# Patient Record
Sex: MALE | Race: WHITE | Employment: OTHER | URBAN - NONMETROPOLITAN AREA
[De-identification: names, ages, dates, MRNs, and addresses within clinical notes are randomized per-mention and may not be internally consistent; named-entity substitution may affect disease eponyms.]

---

## 2018-08-23 ENCOUNTER — HOSPITAL ENCOUNTER (EMERGENCY)
Facility: HOSPITAL | Age: 63
Discharge: HOME | End: 2018-08-23
Attending: PHYSICIAN ASSISTANT | Admitting: PHYSICIAN ASSISTANT
Payer: COMMERCIAL

## 2018-08-23 VITALS
DIASTOLIC BLOOD PRESSURE: 81 MMHG | OXYGEN SATURATION: 98 % | SYSTOLIC BLOOD PRESSURE: 128 MMHG | HEART RATE: 89 BPM | RESPIRATION RATE: 16 BRPM | TEMPERATURE: 98.4 F

## 2018-08-23 DIAGNOSIS — J20.8 ACUTE BRONCHITIS DUE TO OTHER SPECIFIED ORGANISMS: ICD-10-CM

## 2018-08-23 PROCEDURE — G0463 HOSPITAL OUTPT CLINIC VISIT: HCPCS

## 2018-08-23 PROCEDURE — 99203 OFFICE O/P NEW LOW 30 MIN: CPT | Performed by: PHYSICIAN ASSISTANT

## 2018-08-23 RX ORDER — AZITHROMYCIN 250 MG/1
TABLET, FILM COATED ORAL
Qty: 6 TABLET | Refills: 0 | Status: SHIPPED | OUTPATIENT
Start: 2018-08-23

## 2018-08-23 ASSESSMENT — ENCOUNTER SYMPTOMS
VOMITING: 0
EYE REDNESS: 0
ABDOMINAL PAIN: 0
FATIGUE: 0
NECK PAIN: 0
NECK STIFFNESS: 0
APPETITE CHANGE: 0
HEADACHES: 0
NAUSEA: 0
CARDIOVASCULAR NEGATIVE: 1
DIZZINESS: 0
COUGH: 1
DIARRHEA: 0
SORE THROAT: 0
LIGHT-HEADEDNESS: 0
TROUBLE SWALLOWING: 0
PSYCHIATRIC NEGATIVE: 1
EYE DISCHARGE: 0
VOICE CHANGE: 0
FEVER: 0
SINUS PRESSURE: 0

## 2018-08-23 NOTE — ED TRIAGE NOTES
Pt presents today with SO with c/o exposure to whooping cough from son who has been sick for several weeks. Pt states he has had a cough for 3 weeks that is not going away.

## 2018-08-23 NOTE — ED AVS SNAPSHOT
[facility]    [address] [address]    Phone:  [phone]                                       Mulugeta White   MRN: [id number]    Department:  [facility]   Date of Visit:  8/23/2018           After Visit Summary Signature Page     I have received my discharge instructions, and my questions have been answered. I have discussed any challenges I see with this plan with the nurse or doctor.    ..........................................................................................................................................  Patient/Patient Representative Signature      ..........................................................................................................................................  Patient Representative Print Name and Relationship to Patient    ..................................................               ................................................  Date                                            Time    ..........................................................................................................................................  Reviewed by Signature/Title    ...................................................              ..............................................  Date                                                            Time          22EPIC Rev 08/18

## 2018-08-23 NOTE — ED PROVIDER NOTES
"  History     Chief Complaint   Patient presents with     Pertussis Exposure     The history is provided by the patient. No  was used.     Mulugeta White is a 62 year old male who has had cough/congestion x 3 weeks. His son was just dx with \"Pertussis.\" no n/v/d/f/c. No face/sinus pain/pressure. No rash. No decrease in energy/appetite. No ear pain. No sore throat        Past Medical History:    Past Medical History:   Diagnosis Date     ACL tear      Hyperlipidemia      Hypertension      Osteoarthrosis      Rotator cuff rupture        Past Surgical History:    Past Surgical History:   Procedure Laterality Date     C REPAIR CRUCIATE LIGAMENT,KNEE      right     COLONOSCOPY  12/13/2013    Procedure: COLONOSCOPY;  COLONOSCOPY WITH POSSIBLE BIOPSIES;  Surgeon: Nishant Weinstein MD;  Location: [facility]     ROTATOR CUFF REPAIR RT/LT       VASECTOMY         Family History:    Family History   Problem Relation Age of Onset     Diabetes Mother      Hypertension Mother        Social History:  Marital Status:   [2]  Social History   Substance Use Topics     Smoking status: Never Smoker     Smokeless tobacco: Never Used     Alcohol use No        Medications:      azithromycin (ZITHROMAX) 250 MG tablet   amLODIPine (NORVASC) 5 MG tablet   DiphenhydrAMINE HCl (BENADRYL PO)   rosuvastatin (CRESTOR) 10 MG tablet         Review of Systems   Constitutional: Negative for appetite change, fatigue and fever.   HENT: Positive for congestion. Negative for ear pain, sinus pressure, sore throat, trouble swallowing and voice change.    Eyes: Negative for discharge and redness.   Respiratory: Positive for cough.    Cardiovascular: Negative.    Gastrointestinal: Negative for abdominal pain, diarrhea, nausea and vomiting.   Genitourinary: Negative.    Musculoskeletal: Negative for neck pain and neck stiffness.   Skin: Negative for rash.   Neurological: Negative for dizziness, light-headedness and headaches. "   Psychiatric/Behavioral: Negative.        Physical Exam   BP: 128/81  Pulse: 89  Temp: 98.4  F (36.9  C)  Resp: 16  SpO2: 98 %      Physical Exam   Constitutional: He is oriented to person, place, and time. He appears well-developed and well-nourished. No distress.   HENT:   Head: Normocephalic and atraumatic.   Right Ear: External ear normal.   Left Ear: External ear normal.   Mouth/Throat: Oropharynx is clear and moist.   Bilateral TMs/canals clear/wnl  No sinus TTP     Eyes: Conjunctivae and EOM are normal. Right eye exhibits no discharge. Left eye exhibits no discharge.   Neck: Normal range of motion. Neck supple.   Cardiovascular: Normal rate, regular rhythm and normal heart sounds.    Pulmonary/Chest: Effort normal and breath sounds normal. No respiratory distress.   Abdominal: Soft. Bowel sounds are normal. He exhibits no distension. There is no tenderness.   Neurological: He is alert and oriented to person, place, and time.   Skin: Skin is warm and dry. No rash noted. He is not diaphoretic.   Psychiatric: He has a normal mood and affect.   Nursing note and vitals reviewed.      ED Course     ED Course     Procedures            Assessments & Plan (with Medical Decision Making)     I have reviewed the nursing notes.    I have reviewed the findings, diagnosis, plan and need for follow up with the patient.      Discharge Medication List as of 8/23/2018  4:02 PM      START taking these medications    Details   azithromycin (ZITHROMAX) 250 MG tablet Take 2 tablets today then one daily for the next 4 days, Disp-6 tablet, R-0, E-Prescribe             Final diagnoses:   Acute bronchitis due to other specified organisms         Patient verbally educated and given appropriate education sheets for each of the diagnoses and has no questions.  Take OTC motrin or tylenol as directed on the bottle as needed.  Take prescription medications as directed.  Increase fluids, wash hands often.  Sleep in a recliner or with  multiple pillows until this has resolved.  Follow up with your provider if symptoms increase or if further concerns develop, return to the ER.  Allegra Suazo Certified   Physician Assistant  8/23/2018  4:10 PM  URGENT CARE CLINIC    8/23/2018   [facility]     Allegra Suazo PA  08/23/18 [id number]

## 2018-08-23 NOTE — ED AVS SNAPSHOT
[facility]    [address] [address]    Phone:  [phone]                                       Mulugeta White   MRN: [id number]    Department:  [facility]   Date of Visit:  8/23/2018           Patient Information     Date Of Birth          1955        Your diagnoses for this visit were:     Acute bronchitis due to other specified organisms        You were seen by Allegra Suazo PA.      Follow-up Information     Follow up with Nishant Weinstein MD.    Specialty:  Family Practice    Why:  If symptoms worsen    Contact information:    [facility]  [address] [facility] [id number] [phone]          Follow up with [facility].    Specialty:  EMERGENCY MEDICINE    Why:  If further concerns develop    Contact information:    [address] [address] [phone]    Additional information:    From [facility]: Take [address]. Turn left at [address]. Turn left at the first stoplight on [address]. At the first stop sign, take a right onto [address]. Take a left into the parking lot and continue through until you reach the North enterance of the building.       From [place]: Take [address]. Take the [address] ramp towards [place]. Turn left onto [address]. Take a slight right onto [address]. Turn left at the first stoplight on [address]. At the first stop sign, take a right onto [address]. Take a left into the parking lot and continue through until you reach the North enterance of the building.       From [place]: Take [address]. Take a right at [address]. At the first stop sign, take a right onto [address]. Take a left into the parking lot and continue through until you reach the North enterance of the building.         Discharge Instructions       Raise head of bed 6 inches.       Review of your medicines      START  taking        Dose / Directions Last dose taken    azithromycin 250 MG tablet   Commonly known as:  ZITHROMAX   Quantity:  6 tablet        Take 2 tablets today then one daily for the next 4 days   Refills:  0          Our records show that you are taking the medicines listed below. If these are incorrect, please call your family doctor or clinic.        Dose / Directions Last dose taken    amLODIPine 5 MG tablet   Commonly known as:  NORVASC   Dose:  5 mg        Take 5 mg by mouth daily   Refills:  0        BENADRYL PO   Dose:  50 mg        Take 50 mg by mouth   Refills:  0        CRESTOR 10 MG tablet   Dose:  10 mg   Generic drug:  rosuvastatin        Take 10 mg by mouth daily   Refills:  0                Prescriptions were sent or printed at these locations (1 Prescription)                   [id number] - [place], [place] - [address]   [address] [place] [address]    Telephone:  [phone]   Fax:  [phone]   Hours:                  E-Prescribed (1 of 1)         azithromycin (ZITHROMAX) 250 MG tablet                Orders Needing Specimen Collection     None      Pending Results     No orders found from 8/21/2018 to 8/24/2018.            Pending Culture Results     No orders found from 8/21/2018 to 8/24/2018.            Thank you for choosing [facility]       Thank you for choosing [facility] for your care. Our goal is always to provide you with excellent care. Hearing back from our patients is one way we can continue to improve our services. Please take a few minutes to complete the written survey that you may receive in the mail after you visit with us. Thank you!        [facility] Information     [facility] gives you secure access to your electronic health record. If you see a primary care provider, you can also send messages to your care team and make appointments. If you have questions, please call your primary care clinic.  If you do not have a primary care provider, please call [phone]  and they will assist you.        Care EveryWhere ID     This is your Care EveryWhere ID. This could be used by other organizations to access your [facility] medical records  [id number]        Equal Access to Services     KAREN WADE : Maureen Lebron, belkis tang, rowan zayas, key dixon. So [facility] [phone].    ATENCIÓN: Si habla español, tiene a peck disposición servicios gratuitos de asistencia lingüística. Llame al [phone].    We comply with applicable federal civil rights laws and [place] laws. We do not discriminate on the basis of race, color, national origin, age, disability, sex, sexual orientation, or gender identity.            After Visit Summary       This is your record. Keep this with you and show to your community pharmacist(s) and doctor(s) at your next visit.

## 2020-03-02 ENCOUNTER — HEALTH MAINTENANCE LETTER (OUTPATIENT)
Age: 65
End: 2020-03-02

## 2020-12-14 ENCOUNTER — HEALTH MAINTENANCE LETTER (OUTPATIENT)
Age: 65
End: 2020-12-14

## 2021-01-15 ENCOUNTER — HEALTH MAINTENANCE LETTER (OUTPATIENT)
Age: 66
End: 2021-01-15

## 2021-10-02 ENCOUNTER — HEALTH MAINTENANCE LETTER (OUTPATIENT)
Age: 66
End: 2021-10-02

## 2022-01-22 ENCOUNTER — HEALTH MAINTENANCE LETTER (OUTPATIENT)
Age: 67
End: 2022-01-22

## 2022-09-04 ENCOUNTER — HEALTH MAINTENANCE LETTER (OUTPATIENT)
Age: 67
End: 2022-09-04

## 2023-04-29 ENCOUNTER — HEALTH MAINTENANCE LETTER (OUTPATIENT)
Age: 68
End: 2023-04-29